# Patient Record
Sex: FEMALE | Race: WHITE | NOT HISPANIC OR LATINO | URBAN - METROPOLITAN AREA
[De-identification: names, ages, dates, MRNs, and addresses within clinical notes are randomized per-mention and may not be internally consistent; named-entity substitution may affect disease eponyms.]

---

## 2020-07-25 ENCOUNTER — TELEPHONE ENCOUNTER (OUTPATIENT)
Dept: URBAN - METROPOLITAN AREA CLINIC 13 | Facility: CLINIC | Age: 65
End: 2020-07-25

## 2020-07-25 RX ORDER — DOCUSATE SODIUM 100 MG/1
TAKE 1 CAPSULE DAILY CAPSULE, LIQUID FILLED ORAL
Refills: 0 | OUTPATIENT
End: 2015-09-28

## 2020-07-25 RX ORDER — POLYETHYLENE GLYCOL 3350, SODIUM CHLORIDE, SODIUM BICARBONATE AND POTASSIUM CHLORIDE WITH LEMON FLAVOR 420; 11.2; 5.72; 1.48 G/4L; G/4L; G/4L; G/4L
TAKE 1/2 GALLON AT 5:00 PM DAY BEFORE PROCEDURE, TAKE SECOND 1/2 OF GALLON 6 HRS PRIOR TO PROCEDURE POWDER, FOR SOLUTION ORAL
Qty: 1 | Refills: 0 | OUTPATIENT
Start: 2018-06-20 | End: 2018-06-28

## 2020-07-25 RX ORDER — ESOMEPRAZOLE MAGNESIUM 40 MG/1
TAKE ONE CAPSULE BY MOUTH TWICE DAILY CAPSULE, DELAYED RELEASE PELLETS ORAL
Qty: 60 | Refills: 1 | OUTPATIENT
Start: 2015-11-13 | End: 2017-03-10

## 2020-07-25 RX ORDER — GUAIFENESIN AND DEXTROMETHORPHAN HYDROBROMIDE 1200; 60 MG/1; MG/1
TAKE 1 TABLET EVERY 12 HOURS AS NEEDED TABLET, EXTENDED RELEASE ORAL
Refills: 0 | OUTPATIENT
End: 2014-04-25

## 2020-07-25 RX ORDER — FAMOTIDINE 40 MG/1
TAKE ONE TABLET BY MOUTH TWICE A DAY TABLET ORAL
Qty: 60 | Refills: 0 | OUTPATIENT
Start: 2014-04-23 | End: 2016-03-07

## 2020-07-25 RX ORDER — ESOMEPRAZOLE MAGNESIUM 40 MG/1
TAKE 1 CAPSULE DAILY CAPSULE, DELAYED RELEASE PELLETS ORAL
Qty: 30 | Refills: 3 | OUTPATIENT
Start: 2015-09-28 | End: 2017-03-10

## 2020-07-25 RX ORDER — FAMOTIDINE 40 MG/1
TAKE 1 TABLET DAILY AS DIRECTED TABLET ORAL
Refills: 0 | OUTPATIENT
Start: 2013-08-27 | End: 2014-04-25

## 2020-07-25 RX ORDER — ESTROGENS, CONJUGATED 0.45 MG/1
TAKE 1 TABLET DAILY TABLET, FILM COATED ORAL
Refills: 0 | OUTPATIENT
Start: 2008-06-12 | End: 2014-04-23

## 2020-07-26 ENCOUNTER — TELEPHONE ENCOUNTER (OUTPATIENT)
Dept: URBAN - METROPOLITAN AREA CLINIC 13 | Facility: CLINIC | Age: 65
End: 2020-07-26

## 2020-07-26 RX ORDER — BENAZEPRIL HYDROCHLORIDE AND HYDROCHLOROTHIAZIDE 20; 12.5 MG/1; MG/1
TABLET, FILM COATED ORAL
Qty: 30 | Refills: 0 | Status: ACTIVE | COMMUNITY
Start: 2013-04-01

## 2020-07-26 RX ORDER — FAMOTIDINE 40 MG/1
TAKE 1 TABLET BY MOUTH TWICE A DAY TABLET ORAL
Qty: 180 | Refills: 3 | Status: ACTIVE | COMMUNITY
Start: 2016-03-07

## 2020-07-26 RX ORDER — ESTRADIOL 0.5 MG/1
TABLET ORAL
Qty: 30 | Refills: 0 | Status: ACTIVE | COMMUNITY
Start: 2013-09-25

## 2020-07-26 RX ORDER — BENAZEPRIL HYDROCHLORIDE AND HYDROCHLOROTHIAZIDE 20; 12.5 MG/1; MG/1
TAKE 1 TABLET DAILY TABLET, FILM COATED ORAL
Refills: 0 | Status: ACTIVE | COMMUNITY
Start: 2013-07-01

## 2020-07-26 RX ORDER — AMOXICILLIN 875 MG/1
TABLET, FILM COATED ORAL
Qty: 20 | Refills: 0 | Status: ACTIVE | COMMUNITY
Start: 2013-03-19

## 2020-07-26 RX ORDER — FAMOTIDINE 40 MG/1
TABLET ORAL
Qty: 30 | Refills: 0 | Status: ACTIVE | COMMUNITY
Start: 2013-09-25

## 2020-07-26 RX ORDER — AMOXICILLIN AND CLAVULANATE POTASSIUM 875; 125 MG/1; MG/1
TABLET, FILM COATED ORAL
Qty: 20 | Refills: 0 | Status: ACTIVE | COMMUNITY
Start: 2013-10-11

## 2020-07-26 RX ORDER — PREDNISONE 20 MG/1
TABLET ORAL
Qty: 6 | Refills: 0 | Status: ACTIVE | COMMUNITY
Start: 2013-10-11

## 2020-07-26 RX ORDER — OXYCODONE AND ACETAMINOPHEN 5; 325 MG/1; MG/1
TABLET ORAL
Qty: 40 | Refills: 0 | Status: ACTIVE | COMMUNITY
Start: 2013-03-29

## 2020-07-26 RX ORDER — HYDROCODONE BITARTRATE AND ACETAMINOPHEN 7.5; 325 MG/1; MG/1
TABLET ORAL
Qty: 14 | Refills: 0 | Status: ACTIVE | COMMUNITY
Start: 2014-07-18

## 2020-07-26 RX ORDER — AZELASTINE HYDROCHLORIDE AND FLUTICASONE PROPIONATE 137; 50 UG/1; UG/1
SPRAY, METERED NASAL
Qty: 23 | Refills: 0 | Status: ACTIVE | COMMUNITY
Start: 2013-09-20

## 2020-07-26 RX ORDER — MULTIVIT-MIN/FA/LYCOPEN/LUTEIN .4-300-25
TAKE 1 TABLET DAILY TABLET ORAL
Refills: 0 | Status: ACTIVE | COMMUNITY

## 2020-07-26 RX ORDER — ATORVASTATIN CALCIUM 40 MG/1
TAKE 1 TABLET DAILY AT BEDTIME TABLET, FILM COATED ORAL
Qty: 30 | Refills: 0 | Status: ACTIVE | COMMUNITY
Start: 2013-06-14

## 2022-08-04 ENCOUNTER — OFFICE VISIT (OUTPATIENT)
Dept: URBAN - METROPOLITAN AREA CLINIC 113 | Facility: CLINIC | Age: 67
End: 2022-08-04

## 2022-11-15 ENCOUNTER — OFFICE VISIT (OUTPATIENT)
Dept: URBAN - METROPOLITAN AREA CLINIC 113 | Facility: CLINIC | Age: 67
End: 2022-11-15
Payer: MEDICARE

## 2022-11-15 ENCOUNTER — LAB OUTSIDE AN ENCOUNTER (OUTPATIENT)
Dept: URBAN - METROPOLITAN AREA CLINIC 113 | Facility: CLINIC | Age: 67
End: 2022-11-15

## 2022-11-15 VITALS — RESPIRATION RATE: 20 BRPM | WEIGHT: 165.4 LBS | TEMPERATURE: 97.5 F | HEIGHT: 64 IN | BODY MASS INDEX: 28.24 KG/M2

## 2022-11-15 DIAGNOSIS — K21.9 GASTROESOPHAGEAL REFLUX DISEASE, UNSPECIFIED WHETHER ESOPHAGITIS PRESENT: ICD-10-CM

## 2022-11-15 DIAGNOSIS — E83.00 LOW CERULOPLASMIN LEVEL: ICD-10-CM

## 2022-11-15 DIAGNOSIS — K59.09 INTERMITTENT CONSTIPATION: ICD-10-CM

## 2022-11-15 DIAGNOSIS — K52.9 COLITIS: ICD-10-CM

## 2022-11-15 PROCEDURE — 99244 OFF/OP CNSLTJ NEW/EST MOD 40: CPT

## 2022-11-15 PROCEDURE — 99204 OFFICE O/P NEW MOD 45 MIN: CPT

## 2022-11-15 RX ORDER — ZINC GLUCONATE 50 MG
1 TABLET TABLET ORAL ONCE A DAY
Status: ACTIVE | COMMUNITY

## 2022-11-15 RX ORDER — AMOXICILLIN 875 MG/1
TABLET, FILM COATED ORAL
Qty: 20 | Refills: 0 | Status: ON HOLD | COMMUNITY
Start: 2013-03-19

## 2022-11-15 RX ORDER — MELATONIN 1 MG/ML
1 CAPSULE LIQUID (ML) ORAL ONCE A DAY
Status: ACTIVE | COMMUNITY

## 2022-11-15 RX ORDER — PREDNISONE 20 MG/1
TABLET ORAL
Qty: 6 | Refills: 0 | Status: ON HOLD | COMMUNITY
Start: 2013-10-11

## 2022-11-15 RX ORDER — ATORVASTATIN CALCIUM 40 MG/1
TAKE 1 TABLET DAILY AT BEDTIME TABLET, FILM COATED ORAL
Qty: 30 | Refills: 0 | Status: ACTIVE | COMMUNITY
Start: 2013-06-14

## 2022-11-15 RX ORDER — MULTIVIT-MIN/FA/LYCOPEN/LUTEIN .4-300-25
TAKE 1 TABLET DAILY TABLET ORAL
Refills: 0 | Status: ACTIVE | COMMUNITY

## 2022-11-15 RX ORDER — AMOXICILLIN AND CLAVULANATE POTASSIUM 875; 125 MG/1; MG/1
TABLET, FILM COATED ORAL
Qty: 20 | Refills: 0 | Status: ON HOLD | COMMUNITY
Start: 2013-10-11

## 2022-11-15 RX ORDER — HYDROCODONE BITARTRATE AND ACETAMINOPHEN 7.5; 325 MG/1; MG/1
TABLET ORAL
Qty: 14 | Refills: 0 | Status: ON HOLD | COMMUNITY
Start: 2014-07-18

## 2022-11-15 RX ORDER — FAMOTIDINE 40 MG/1
TABLET ORAL
Qty: 30 | Refills: 0 | Status: ON HOLD | COMMUNITY
Start: 2013-09-25

## 2022-11-15 RX ORDER — AZELASTINE HYDROCHLORIDE AND FLUTICASONE PROPIONATE 137; 50 UG/1; UG/1
SPRAY, METERED NASAL
Qty: 23 | Refills: 0 | Status: ON HOLD | COMMUNITY
Start: 2013-09-20

## 2022-11-15 RX ORDER — PANTOPRAZOLE SODIUM 40 MG/1
1 TABLET TABLET, DELAYED RELEASE ORAL ONCE A DAY
Status: ACTIVE | COMMUNITY

## 2022-11-15 RX ORDER — ESTRADIOL 0.5 MG/1
TABLET ORAL
Qty: 30 | Refills: 0 | Status: ON HOLD | COMMUNITY
Start: 2013-09-25

## 2022-11-15 RX ORDER — OXYCODONE AND ACETAMINOPHEN 5; 325 MG/1; MG/1
TABLET ORAL
Qty: 40 | Refills: 0 | Status: ON HOLD | COMMUNITY
Start: 2013-03-29

## 2022-11-15 RX ORDER — ASPIRIN 81 MG/1
1 TABLET TABLET, COATED ORAL ONCE A DAY
Status: ACTIVE | COMMUNITY

## 2022-11-15 RX ORDER — BENAZEPRIL HYDROCHLORIDE AND HYDROCHLOROTHIAZIDE 20; 12.5 MG/1; MG/1
TABLET, FILM COATED ORAL
Qty: 30 | Refills: 0 | Status: ACTIVE | COMMUNITY
Start: 2013-04-01

## 2022-11-15 NOTE — PHYSICAL EXAM EYES:
Conjunctivae and eyelids appear normal, Sclerae : White without injection, no icterus, no evidence of dark rings on the outer borders of irises bilaterally

## 2022-11-15 NOTE — HPI-TODAY'S VISIT:
67-year-old female with a history of vitamin D deficiency, hyperlipidemia, hypertension, GERD is referred by Dr. Jamal Grimes for evaluation of elevated liver enzymes.  A copy of today's visit will be forwarded to the referring provider.  Patient has been followed by Dr. Gordon since 2008.  Screening colonoscopy performed in May 2008 revealed focal active colitis and ileitis suggestive of IBD.  Possible subclinical Crohn's disease was suspected though she was asymptomatic and no treatment was required at that time.  EGD performed in May 2014 for severe belching and GERD symptoms was notable for a small hiatal hernia and nonspecific chronic inactive gastritis.  Gastric emptying study at that same time demonstrated markedly delayed gastric emptying suggesting gastroparesis or possible gastric outlet obstruction.  She was to continue Pepcid twice daily and metoclopramide twice daily was added in.  This provided significant improvement she was to continue this regimen.  She did have elevated liver enzymes in 2014.  Ultrasound was unremarkable and additional labs were performed however it appears this only included negative iron studies and hepatitis serologies. Repeat colonoscopy in June 2018 was notable for diffuse chronic active colitis of the cecum of unknown etiology.  Again findings were suspicious for asymptomatic ulcerative colitis versus Crohn's though may also be NSAID induced colitis.  She was encouraged to minimize her NSAID use.  Next colonoscopy is due in June 2028.  She was recommended to add in FDguard twice daily for her upper GI symptoms with consideration of restarting metoclopramide in the future to relieve belching.  She has been lost to follow-up ever since 2018.  Of note, patient is on a statin medication.  Labs 7/12/2022: Negative BRIANA, positive ASMA of 55, negative hepatitis A antibody total, negative hepatitis B surface antigen, negative hepatitis B surface antibody, negative hepatitis B core antibody, negative hepatitis C antibody, low ceruloplasmin 17.6, positive AMA 44.9, elevated , elevated , normal ALP, normal total bilirubin, normal iron, normal ferritin, normal vitamin D, unremarkable lipid panel.  Labs 6/7/2022:, , hemoglobin 11.7, normal hematocrit, normal platelets.  RUQ US 8/17/22: Mildly echogenic liver suggesting hepatic steatosis. Normal shear wave velocity.   Labs 11/7/22: unremarkable CBC, normal lipid panel, AST 54, ALT 66, normal ALP, normal total bilirubin.   She denies a family history of liver disease. She rarely consumes ETOH and denies excessive Tylenol use. She goes to [facility] exams. She was recently seen in February and states everything was normal. She does have "sinus problems" described as water building up behind her eyes. She did have back surgery in January 2020. She fell shortly after that surgery and now has chronic hip pain. She does have shoulder pains which she attributes to her sleeping position secondary to her hip pain. She otherwise denies arthralgias. She does admit to worsening fatigue as of late. She admits she had the Covid booster in April prior to having her blood drawn in May.   Bowel movements are typically regular. SHe may have intermittent constipation. She does strain on occasion. She notices her constipation worsens when she eats beef. She denies blood per rectum or melena. She does have abdominal discomfort during bouts of constipation. This may be located in the RLQ or RUQ. The episodes are intermittent and not bothersome. She denies nausea or vomiting. She does have chronic GERD. She has to sleep with two pillows at night. She states the indigestion and epigastric burning will awaken her at night. She was taking up to 80 mg of OTC Pepcid a day. She was started on Pantoprazole 40 mg yesterday. She has already noticed improvement in sleep. She would like to proceed with a repeat EGD as she is concerned for gastric cancer or a stomach ulcer.

## 2022-11-18 ENCOUNTER — OFFICE VISIT (OUTPATIENT)
Dept: URBAN - METROPOLITAN AREA SURGERY CENTER 25 | Facility: SURGERY CENTER | Age: 67
End: 2022-11-18
Payer: MEDICARE

## 2022-11-18 DIAGNOSIS — K31.7 GASTRIC POLYPS: ICD-10-CM

## 2022-11-18 DIAGNOSIS — K31.811 ACQUIRED ARTERIOVENOUS MALFORMATION OF DUODENUM WITH HEMORRHAGE: ICD-10-CM

## 2022-11-18 DIAGNOSIS — K21.9 GASTROESOPHAGEAL REFLUX DISEASE: ICD-10-CM

## 2022-11-18 PROCEDURE — 43255 EGD CONTROL BLEEDING ANY: CPT | Performed by: INTERNAL MEDICINE

## 2022-11-18 PROCEDURE — G8907 PT DOC NO EVENTS ON DISCHARG: HCPCS | Performed by: INTERNAL MEDICINE

## 2022-11-18 RX ORDER — MELATONIN 1 MG/ML
1 CAPSULE LIQUID (ML) ORAL ONCE A DAY
Status: ACTIVE | COMMUNITY

## 2022-11-18 RX ORDER — OXYCODONE AND ACETAMINOPHEN 5; 325 MG/1; MG/1
TABLET ORAL
Qty: 40 | Refills: 0 | Status: ON HOLD | COMMUNITY
Start: 2013-03-29

## 2022-11-18 RX ORDER — PANTOPRAZOLE SODIUM 40 MG/1
1 TABLET TABLET, DELAYED RELEASE ORAL ONCE A DAY
Status: ACTIVE | COMMUNITY

## 2022-11-18 RX ORDER — BENAZEPRIL HYDROCHLORIDE AND HYDROCHLOROTHIAZIDE 20; 12.5 MG/1; MG/1
TABLET, FILM COATED ORAL
Qty: 30 | Refills: 0 | Status: ACTIVE | COMMUNITY
Start: 2013-04-01

## 2022-11-18 RX ORDER — ATORVASTATIN CALCIUM 40 MG/1
TAKE 1 TABLET DAILY AT BEDTIME TABLET, FILM COATED ORAL
Qty: 30 | Refills: 0 | Status: ACTIVE | COMMUNITY
Start: 2013-06-14

## 2022-11-18 RX ORDER — AMOXICILLIN AND CLAVULANATE POTASSIUM 875; 125 MG/1; MG/1
TABLET, FILM COATED ORAL
Qty: 20 | Refills: 0 | Status: ON HOLD | COMMUNITY
Start: 2013-10-11

## 2022-11-18 RX ORDER — HYDROCODONE BITARTRATE AND ACETAMINOPHEN 7.5; 325 MG/1; MG/1
TABLET ORAL
Qty: 14 | Refills: 0 | Status: ON HOLD | COMMUNITY
Start: 2014-07-18

## 2022-11-18 RX ORDER — FAMOTIDINE 40 MG/1
TABLET ORAL
Qty: 30 | Refills: 0 | Status: ON HOLD | COMMUNITY
Start: 2013-09-25

## 2022-11-18 RX ORDER — AMOXICILLIN 875 MG/1
TABLET, FILM COATED ORAL
Qty: 20 | Refills: 0 | Status: ON HOLD | COMMUNITY
Start: 2013-03-19

## 2022-11-18 RX ORDER — AZELASTINE HYDROCHLORIDE AND FLUTICASONE PROPIONATE 137; 50 UG/1; UG/1
SPRAY, METERED NASAL
Qty: 23 | Refills: 0 | Status: ON HOLD | COMMUNITY
Start: 2013-09-20

## 2022-11-18 RX ORDER — ASPIRIN 81 MG/1
1 TABLET TABLET, COATED ORAL ONCE A DAY
Status: ACTIVE | COMMUNITY

## 2022-11-18 RX ORDER — ZINC GLUCONATE 50 MG
1 TABLET TABLET ORAL ONCE A DAY
Status: ACTIVE | COMMUNITY

## 2022-11-18 RX ORDER — MULTIVIT-MIN/FA/LYCOPEN/LUTEIN .4-300-25
TAKE 1 TABLET DAILY TABLET ORAL
Refills: 0 | Status: ACTIVE | COMMUNITY

## 2022-11-18 RX ORDER — ESTRADIOL 0.5 MG/1
TABLET ORAL
Qty: 30 | Refills: 0 | Status: ON HOLD | COMMUNITY
Start: 2013-09-25

## 2022-11-18 RX ORDER — PREDNISONE 20 MG/1
TABLET ORAL
Qty: 6 | Refills: 0 | Status: ON HOLD | COMMUNITY
Start: 2013-10-11

## 2022-11-26 LAB
ACTIN (SMOOTH MUSCLE) ANTIBODY (IGG): 40
ALBUMIN/GLOBULIN RATIO: 1.6
ALBUMIN: 4.4
ALKALINE PHOSPHATASE: 50
ALPHA-1-ANTITRYPSIN (AAT) PHENOTYPE: (no result)
ALPHA-1-ANTITRYPSIN QN: 146
ALT (SGPT): 60
ANA SCREEN, IFA: POSITIVE
AST (SGOT): 49
BILIRUBIN, DIRECT: 0.1
BILIRUBIN, INDIRECT: 0.2
BILIRUBIN, TOTAL: 0.3
CERULOPLASMIN: 22
GLOBULIN: 2.8
IMMUNOGLOBULIN A: 104
IMMUNOGLOBULIN G: 1419
IMMUNOGLOBULIN M: 145
LKM-1 ANTIBODY (IGG): <=20
MITOCHONDRIAL (M2) ANTIBODY: 35.2
PROTEIN, TOTAL: 7.2
T-TRANSGLUTAMINASE (TTG) IGA: <1

## 2022-11-28 ENCOUNTER — TELEPHONE ENCOUNTER (OUTPATIENT)
Dept: URBAN - METROPOLITAN AREA CLINIC 113 | Facility: CLINIC | Age: 67
End: 2022-11-28

## 2022-11-29 PROBLEM — 235595009 GASTROESOPHAGEAL REFLUX DISEASE: Status: ACTIVE | Noted: 2022-11-29

## 2022-11-29 PROBLEM — 92411005 BENIGN NEOPLASM OF STOMACH: Status: ACTIVE | Noted: 2022-11-29

## 2022-11-30 ENCOUNTER — OFFICE VISIT (OUTPATIENT)
Dept: URBAN - METROPOLITAN AREA CLINIC 113 | Facility: CLINIC | Age: 67
End: 2022-11-30
Payer: MEDICARE

## 2022-11-30 VITALS
TEMPERATURE: 98 F | RESPIRATION RATE: 16 BRPM | HEIGHT: 64 IN | BODY MASS INDEX: 27.96 KG/M2 | SYSTOLIC BLOOD PRESSURE: 145 MMHG | HEART RATE: 93 BPM | WEIGHT: 163.8 LBS | DIASTOLIC BLOOD PRESSURE: 73 MMHG

## 2022-11-30 DIAGNOSIS — E83.00 LOW CERULOPLASMIN LEVEL: ICD-10-CM

## 2022-11-30 DIAGNOSIS — R74.8 ELEVATED LIVER ENZYMES: ICD-10-CM

## 2022-11-30 DIAGNOSIS — K21.9 GASTROESOPHAGEAL REFLUX DISEASE, UNSPECIFIED WHETHER ESOPHAGITIS PRESENT: ICD-10-CM

## 2022-11-30 DIAGNOSIS — K31.811 AVM (ARTERIOVENOUS MALFORMATION) OF DUODENUM, ACQUIRED WITH HEMORRHAGE: ICD-10-CM

## 2022-11-30 DIAGNOSIS — K59.09 INTERMITTENT CONSTIPATION: ICD-10-CM

## 2022-11-30 DIAGNOSIS — R76.8 ANTIMITOCHONDRIAL ANTIBODY POSITIVE: ICD-10-CM

## 2022-11-30 PROBLEM — 235595009: Status: ACTIVE | Noted: 2022-11-15

## 2022-11-30 PROBLEM — 365767001: Status: ACTIVE | Noted: 2022-11-15

## 2022-11-30 PROCEDURE — 99214 OFFICE O/P EST MOD 30 MIN: CPT | Performed by: INTERNAL MEDICINE

## 2022-11-30 RX ORDER — ESTRADIOL 0.5 MG/1
TABLET ORAL
Qty: 30 | Refills: 0 | Status: ON HOLD | COMMUNITY
Start: 2013-09-25

## 2022-11-30 RX ORDER — FENOFIBRATE 134 MG/1
1 CAPSULE WITH A MEAL CAPSULE ORAL ONCE A DAY
Status: ACTIVE | COMMUNITY

## 2022-11-30 RX ORDER — AMOXICILLIN 875 MG/1
TABLET, FILM COATED ORAL
Qty: 20 | Refills: 0 | Status: ON HOLD | COMMUNITY
Start: 2013-03-19

## 2022-11-30 RX ORDER — FAMOTIDINE 40 MG/1
TABLET ORAL
Qty: 30 | Refills: 0 | Status: ON HOLD | COMMUNITY
Start: 2013-09-25

## 2022-11-30 RX ORDER — BENAZEPRIL HYDROCHLORIDE AND HYDROCHLOROTHIAZIDE 20; 12.5 MG/1; MG/1
TABLET, FILM COATED ORAL
Qty: 30 | Refills: 0 | Status: ACTIVE | COMMUNITY
Start: 2013-04-01

## 2022-11-30 RX ORDER — MULTIVIT-MIN/FA/LYCOPEN/LUTEIN .4-300-25
TAKE 1 TABLET DAILY TABLET ORAL
Refills: 0 | Status: ON HOLD | COMMUNITY

## 2022-11-30 RX ORDER — HYDROCODONE BITARTRATE AND ACETAMINOPHEN 7.5; 325 MG/1; MG/1
TABLET ORAL
Qty: 14 | Refills: 0 | Status: ON HOLD | COMMUNITY
Start: 2014-07-18

## 2022-11-30 RX ORDER — MELATONIN 1 MG/ML
1 CAPSULE LIQUID (ML) ORAL ONCE A DAY
Status: ACTIVE | COMMUNITY

## 2022-11-30 RX ORDER — AMOXICILLIN AND CLAVULANATE POTASSIUM 875; 125 MG/1; MG/1
TABLET, FILM COATED ORAL
Qty: 20 | Refills: 0 | Status: ON HOLD | COMMUNITY
Start: 2013-10-11

## 2022-11-30 RX ORDER — AZELASTINE HYDROCHLORIDE AND FLUTICASONE PROPIONATE 137; 50 UG/1; UG/1
SPRAY, METERED NASAL
Qty: 23 | Refills: 0 | Status: ON HOLD | COMMUNITY
Start: 2013-09-20

## 2022-11-30 RX ORDER — PREDNISONE 20 MG/1
TABLET ORAL
Qty: 6 | Refills: 0 | Status: ON HOLD | COMMUNITY
Start: 2013-10-11

## 2022-11-30 RX ORDER — ATORVASTATIN CALCIUM 40 MG/1
TAKE 1 TABLET DAILY AT BEDTIME TABLET, FILM COATED ORAL
Qty: 30 | Refills: 0 | Status: ACTIVE | COMMUNITY
Start: 2013-06-14

## 2022-11-30 RX ORDER — GUAIFENESIN 600 MG/1
1 TABLET AS NEEDED TABLET, EXTENDED RELEASE ORAL
Status: ACTIVE | COMMUNITY

## 2022-11-30 RX ORDER — ZINC GLUCONATE 50 MG
1 TABLET TABLET ORAL ONCE A DAY
Status: ON HOLD | COMMUNITY

## 2022-11-30 RX ORDER — OXYCODONE AND ACETAMINOPHEN 5; 325 MG/1; MG/1
TABLET ORAL
Qty: 40 | Refills: 0 | Status: ON HOLD | COMMUNITY
Start: 2013-03-29

## 2022-11-30 RX ORDER — ASPIRIN 81 MG/1
1 TABLET TABLET, COATED ORAL ONCE A DAY
Status: ACTIVE | COMMUNITY

## 2022-11-30 RX ORDER — PANTOPRAZOLE SODIUM 40 MG/1
1 TABLET TABLET, DELAYED RELEASE ORAL ONCE A DAY
Status: ACTIVE | COMMUNITY

## 2022-11-30 NOTE — HPI-TODAY'S VISIT:
67-year-old female with a history of vitamin D deficiency, hyperlipidemia, hypertension, GERD is referred by Dr. Jamal Grimes for evaluation of elevated liver enzymes.  A copy of today's visit will be forwarded to the referring provider.  Patient has been followed by Dr. Gordon since 2008.  Screening colonoscopy performed in May 2008 revealed focal active colitis and ileitis suggestive of IBD.  Possible subclinical Crohn's disease was suspected though she was asymptomatic and no treatment was required at that time.  EGD performed in May 2014 for severe belching and GERD symptoms was notable for a small hiatal hernia and nonspecific chronic inactive gastritis.  Gastric emptying study at that same time demonstrated markedly delayed gastric emptying suggesting gastroparesis or possible gastric outlet obstruction.  She was to continue Pepcid twice daily and metoclopramide twice daily was added in.  This provided significant improvement she was to continue this regimen.  She did have elevated liver enzymes in 2014.  Ultrasound was unremarkable and additional labs were performed however it appears this only included negative iron studies and hepatitis serologies. Repeat colonoscopy in June 2018 was notable for diffuse chronic active colitis of the cecum of unknown etiology.  Again findings were suspicious for asymptomatic ulcerative colitis versus Crohn's though may also be NSAID induced colitis.  She was encouraged to minimize her NSAID use.  Next colonoscopy is due in June 2028.  She was recommended to add in FDguard twice daily for her upper GI symptoms with consideration of restarting metoclopramide in the future to relieve belching.  She has been lost to follow-up ever since 2018.  Of note, patient is on a statin medication.  Labs 7/12/2022: Negative BRIANA, positive ASMA of 55, negative hepatitis A antibody total, negative hepatitis B surface antigen, negative hepatitis B surface antibody, negative hepatitis B core antibody, negative hepatitis C antibody, low ceruloplasmin 17.6, positive AMA 44.9, elevated , elevated , normal ALP, normal total bilirubin, normal iron, normal ferritin, normal vitamin D, unremarkable lipid panel.  Labs 6/7/2022:, , hemoglobin 11.7, normal hematocrit, normal platelets.  RUQ US 8/17/22: Mildly echogenic liver suggesting hepatic steatosis. Normal shear wave velocity.   Labs 11/7/22: unremarkable CBC, normal lipid panel, AST 54, ALT 66, normal ALP, normal total bilirubin.   She denies a family history of liver disease. She rarely consumes ETOH and denies excessive Tylenol use. She goes to [facility] for [facility] eye exams. She was recently seen in February and states everything was normal. She does have "sinus problems" described as water building up behind her eyes. She did have back surgery in January 2020. She fell shortly after that surgery and now has chronic hip pain. She does have shoulder pains which she attributes to her sleeping position secondary to her hip pain. She otherwise denies arthralgias. She does admit to worsening fatigue as of late. She admits she had the Covid booster in April prior to having her blood drawn in May.   Bowel movements are typically regular. SHe may have intermittent constipation. She does strain on occasion. She notices her constipation worsens when she eats beef. She denies blood per rectum or melena. She does have abdominal discomfort during bouts of constipation. This may be located in the RLQ or RUQ. The episodes are intermittent and not bothersome. She denies nausea or vomiting. She does have chronic GERD. She has to sleep with two pillows at night. She states the indigestion and epigastric burning will awaken her at night. She was taking up to 80 mg of OTC Pepcid a day. She was started on Pantoprazole 40 mg yesterday. She has already noticed improvement in sleep. She would like to proceed with a repeat EGD as she is concerned for gastric cancer or a stomach ulcer. Interval history, 11/30/2022.  Upper endoscopy performed November 18 revealed a small hiatal hernia along with proton pump inhibitor polyps.  An actively bleeding AVM was seen in the third portion of the duodenum and was cauterized. The patient states overall she feels well at this time.  She discontinued her supplements.  I reviewed her supplements with her.  I provided extensive teaching in regards to autoimmune liver disease and arteriovenous malformations.

## 2023-01-03 ENCOUNTER — OFFICE VISIT (OUTPATIENT)
Dept: URBAN - METROPOLITAN AREA CLINIC 113 | Facility: CLINIC | Age: 68
End: 2023-01-03

## 2023-04-06 ENCOUNTER — OFFICE VISIT (OUTPATIENT)
Dept: URBAN - METROPOLITAN AREA CLINIC 113 | Facility: CLINIC | Age: 68
End: 2023-04-06
Payer: MEDICARE

## 2023-04-06 VITALS
TEMPERATURE: 97.5 F | RESPIRATION RATE: 16 BRPM | WEIGHT: 164 LBS | HEART RATE: 94 BPM | DIASTOLIC BLOOD PRESSURE: 67 MMHG | BODY MASS INDEX: 28 KG/M2 | HEIGHT: 64 IN | SYSTOLIC BLOOD PRESSURE: 147 MMHG

## 2023-04-06 DIAGNOSIS — K21.9 GASTROESOPHAGEAL REFLUX DISEASE, UNSPECIFIED WHETHER ESOPHAGITIS PRESENT: ICD-10-CM

## 2023-04-06 DIAGNOSIS — R74.8 ELEVATED LIVER ENZYMES: ICD-10-CM

## 2023-04-06 DIAGNOSIS — K59.09 INTERMITTENT CONSTIPATION: ICD-10-CM

## 2023-04-06 DIAGNOSIS — R76.8 ANTIMITOCHONDRIAL ANTIBODY POSITIVE: ICD-10-CM

## 2023-04-06 DIAGNOSIS — K31.811 AVM (ARTERIOVENOUS MALFORMATION) OF DUODENUM, ACQUIRED WITH HEMORRHAGE: ICD-10-CM

## 2023-04-06 DIAGNOSIS — E83.00 LOW CERULOPLASMIN LEVEL: ICD-10-CM

## 2023-04-06 PROCEDURE — 99214 OFFICE O/P EST MOD 30 MIN: CPT | Performed by: INTERNAL MEDICINE

## 2023-04-06 RX ORDER — GUAIFENESIN 600 MG/1
1 TABLET AS NEEDED TABLET, EXTENDED RELEASE ORAL
Status: ACTIVE | COMMUNITY

## 2023-04-06 RX ORDER — AZELASTINE HYDROCHLORIDE AND FLUTICASONE PROPIONATE 137; 50 UG/1; UG/1
SPRAY, METERED NASAL
Qty: 23 | Refills: 0 | Status: ON HOLD | COMMUNITY
Start: 2013-09-20

## 2023-04-06 RX ORDER — FENOFIBRATE 134 MG/1
1 CAPSULE WITH A MEAL CAPSULE ORAL ONCE A DAY
Status: ACTIVE | COMMUNITY

## 2023-04-06 RX ORDER — PREDNISONE 20 MG/1
TABLET ORAL
Qty: 6 | Refills: 0 | Status: ON HOLD | COMMUNITY
Start: 2013-10-11

## 2023-04-06 RX ORDER — MULTIVIT-MIN/FA/LYCOPEN/LUTEIN .4-300-25
TAKE 1 TABLET DAILY TABLET ORAL
Refills: 0 | Status: ON HOLD | COMMUNITY

## 2023-04-06 RX ORDER — OXYCODONE AND ACETAMINOPHEN 5; 325 MG/1; MG/1
TABLET ORAL
Qty: 40 | Refills: 0 | Status: ON HOLD | COMMUNITY
Start: 2013-03-29

## 2023-04-06 RX ORDER — AMOXICILLIN 875 MG/1
TABLET, FILM COATED ORAL
Qty: 20 | Refills: 0 | Status: ON HOLD | COMMUNITY
Start: 2013-03-19

## 2023-04-06 RX ORDER — FAMOTIDINE 40 MG/1
TABLET ORAL
Qty: 30 | Refills: 0 | Status: ON HOLD | COMMUNITY
Start: 2013-09-25

## 2023-04-06 RX ORDER — ATORVASTATIN CALCIUM 40 MG/1
TAKE 1 TABLET DAILY AT BEDTIME TABLET, FILM COATED ORAL
Qty: 30 | Refills: 0 | Status: ACTIVE | COMMUNITY
Start: 2013-06-14

## 2023-04-06 RX ORDER — ESTRADIOL 0.5 MG/1
TABLET ORAL
Qty: 30 | Refills: 0 | Status: ON HOLD | COMMUNITY
Start: 2013-09-25

## 2023-04-06 RX ORDER — PANTOPRAZOLE SODIUM 40 MG/1
1 TABLET TABLET, DELAYED RELEASE ORAL ONCE A DAY
Status: ACTIVE | COMMUNITY

## 2023-04-06 RX ORDER — MELATONIN 1 MG/ML
1 CAPSULE LIQUID (ML) ORAL ONCE A DAY
Status: ACTIVE | COMMUNITY

## 2023-04-06 RX ORDER — BENAZEPRIL HYDROCHLORIDE AND HYDROCHLOROTHIAZIDE 20; 12.5 MG/1; MG/1
TABLET, FILM COATED ORAL
Qty: 30 | Refills: 0 | Status: ACTIVE | COMMUNITY
Start: 2013-04-01

## 2023-04-06 RX ORDER — AMOXICILLIN AND CLAVULANATE POTASSIUM 875; 125 MG/1; MG/1
TABLET, FILM COATED ORAL
Qty: 20 | Refills: 0 | Status: ON HOLD | COMMUNITY
Start: 2013-10-11

## 2023-04-06 RX ORDER — HYDROCODONE BITARTRATE AND ACETAMINOPHEN 7.5; 325 MG/1; MG/1
TABLET ORAL
Qty: 14 | Refills: 0 | Status: ON HOLD | COMMUNITY
Start: 2014-07-18

## 2023-04-06 RX ORDER — ZINC GLUCONATE 50 MG
1 TABLET TABLET ORAL ONCE A DAY
Status: ON HOLD | COMMUNITY

## 2023-04-06 RX ORDER — ASPIRIN 81 MG/1
1 TABLET TABLET, COATED ORAL ONCE A DAY
Status: ACTIVE | COMMUNITY

## 2023-04-06 NOTE — HPI-TODAY'S VISIT:
67-year-old female with a history of vitamin D deficiency, hyperlipidemia, hypertension, GERD is referred by Dr. Jamal Grimes for evaluation of elevated liver enzymes.  A copy of today's visit will be forwarded to the referring provider.  Patient has been followed by Dr. Gordon since 2008.  Screening colonoscopy performed in May 2008 revealed focal active colitis and ileitis suggestive of IBD.  Possible subclinical Crohn's disease was suspected though she was asymptomatic and no treatment was required at that time.  EGD performed in May 2014 for severe belching and GERD symptoms was notable for a small hiatal hernia and nonspecific chronic inactive gastritis.  Gastric emptying study at that same time demonstrated markedly delayed gastric emptying suggesting gastroparesis or possible gastric outlet obstruction.  She was to continue Pepcid twice daily and metoclopramide twice daily was added in.  This provided significant improvement she was to continue this regimen.  She did have elevated liver enzymes in 2014.  Ultrasound was unremarkable and additional labs were performed however it appears this only included negative iron studies and hepatitis serologies. Repeat colonoscopy in June 2018 was notable for diffuse chronic active colitis of the cecum of unknown etiology.  Again findings were suspicious for asymptomatic ulcerative colitis versus Crohn's though may also be NSAID induced colitis.  She was encouraged to minimize her NSAID use.  Next colonoscopy is due in June 2028.  She was recommended to add in FDguard twice daily for her upper GI symptoms with consideration of restarting metoclopramide in the future to relieve belching.  She has been lost to follow-up ever since 2018.  Of note, patient is on a statin medication.  Labs 7/12/2022: Negative BRIANA, positive ASMA of 55, negative hepatitis A antibody total, negative hepatitis B surface antigen, negative hepatitis B surface antibody, negative hepatitis B core antibody, negative hepatitis C antibody, low ceruloplasmin 17.6, positive AMA 44.9, elevated , elevated , normal ALP, normal total bilirubin, normal iron, normal ferritin, normal vitamin D, unremarkable lipid panel.  Labs 6/7/2022:, , hemoglobin 11.7, normal hematocrit, normal platelets.  RUQ US 8/17/22: Mildly echogenic liver suggesting hepatic steatosis. Normal shear wave velocity.   Labs 11/7/22: unremarkable CBC, normal lipid panel, AST 54, ALT 66, normal ALP, normal total bilirubin.   She denies a family history of liver disease. She rarely consumes ETOH and denies excessive Tylenol use. She goes to [facility] for routine eye exams. She was recently seen in February and states everything was normal. She does have "sinus problems" described as water building up behind her eyes. She did have back surgery in January 2020. She fell shortly after that surgery and now has chronic hip pain. She does have shoulder pains which she attributes to her sleeping position secondary to her hip pain. She otherwise denies arthralgias. She does admit to worsening fatigue as of late. She admits she had the Covid booster in April prior to having her blood drawn in May.   Bowel movements are typically regular. SHe may have intermittent constipation. She does strain on occasion. She notices her constipation worsens when she eats beef. She denies blood per rectum or melena. She does have abdominal discomfort during bouts of constipation. This may be located in the RLQ or RUQ. The episodes are intermittent and not bothersome. She denies nausea or vomiting. She does have chronic GERD. She has to sleep with two pillows at night. She states the indigestion and epigastric burning will awaken her at night. She was taking up to 80 mg of OTC Pepcid a day. She was started on Pantoprazole 40 mg yesterday. She has already noticed improvement in sleep. She would like to proceed with a repeat EGD as she is concerned for gastric cancer or a stomach ulcer. Interval history, 11/30/2022.  Upper endoscopy performed November 18 revealed a small hiatal hernia along with proton pump inhibitor polyps.  An actively bleeding AVM was seen in the third portion of the duodenum and was cauterized. The patient states overall she feels well at this time.  She discontinued her supplements.  I reviewed her supplements with her.  I provided extensive teaching in regards to autoimmune liver disease and arteriovenous malformations. interval history, 4/6/2023.  The patient states she has been in good health since I last saw her.  She had no laboratory testing since then.  She states her bowel movements are good with MiraLAX which she titrates to effect.  She states her acid reflux is under good control with pantoprazole 40 mg daily.

## 2023-04-07 ENCOUNTER — TELEPHONE ENCOUNTER (OUTPATIENT)
Dept: URBAN - METROPOLITAN AREA CLINIC 113 | Facility: CLINIC | Age: 68
End: 2023-04-07

## 2023-04-07 PROBLEM — 724556004: Status: ACTIVE | Noted: 2023-04-07

## 2023-04-07 LAB
A/G RATIO: 1.6
ABSOLUTE BASOPHILS: 38
ABSOLUTE EOSINOPHILS: 102
ABSOLUTE LYMPHOCYTES: 2182
ABSOLUTE MONOCYTES: 621
ABSOLUTE NEUTROPHILS: 3456
ALBUMIN: 4.4
ALKALINE PHOSPHATASE: 47
ALT (SGPT): 35
AST (SGOT): 36
BASOPHILS: 0.6
BILIRUBIN, TOTAL: 0.3
BUN/CREATININE RATIO: 15
BUN: 18
CALCIUM: 10.3
CARBON DIOXIDE, TOTAL: 26
CHLORIDE: 105
CREATININE: 1.21
EGFR: 48
EOSINOPHILS: 1.6
GLOBULIN, TOTAL: 2.8
GLUCOSE: 70
HEMATOCRIT: 32.6
HEMOGLOBIN: 10.1
LYMPHOCYTES: 34.1
MCH: 24.3
MCHC: 31
MCV: 78.4
MONOCYTES: 9.7
MPV: 11.4
NEUTROPHILS: 54
PLATELET COUNT: 363
POTASSIUM: 4.1
PROTEIN, TOTAL: 7.2
RDW: 14.4
RED BLOOD CELL COUNT: 4.16
SODIUM: 145
WHITE BLOOD CELL COUNT: 6.4

## 2023-09-12 ENCOUNTER — OFFICE VISIT (OUTPATIENT)
Dept: URBAN - METROPOLITAN AREA CLINIC 113 | Facility: CLINIC | Age: 68
End: 2023-09-12
Payer: MEDICARE

## 2023-09-12 VITALS
SYSTOLIC BLOOD PRESSURE: 164 MMHG | DIASTOLIC BLOOD PRESSURE: 77 MMHG | HEART RATE: 103 BPM | WEIGHT: 173.2 LBS | BODY MASS INDEX: 29.57 KG/M2 | RESPIRATION RATE: 18 BRPM | TEMPERATURE: 97.5 F | HEIGHT: 64 IN

## 2023-09-12 DIAGNOSIS — K59.09 INTERMITTENT CONSTIPATION: ICD-10-CM

## 2023-09-12 DIAGNOSIS — R74.8 ELEVATED LIVER ENZYMES: ICD-10-CM

## 2023-09-12 DIAGNOSIS — R76.8 ANTIMITOCHONDRIAL ANTIBODY POSITIVE: ICD-10-CM

## 2023-09-12 DIAGNOSIS — D50.0 IRON DEFICIENCY ANEMIA DUE TO CHRONIC BLOOD LOSS: ICD-10-CM

## 2023-09-12 DIAGNOSIS — E83.00 LOW CERULOPLASMIN LEVEL: ICD-10-CM

## 2023-09-12 DIAGNOSIS — K21.9 GASTROESOPHAGEAL REFLUX DISEASE, UNSPECIFIED WHETHER ESOPHAGITIS PRESENT: ICD-10-CM

## 2023-09-12 DIAGNOSIS — K31.811 AVM (ARTERIOVENOUS MALFORMATION) OF DUODENUM, ACQUIRED WITH HEMORRHAGE: ICD-10-CM

## 2023-09-12 PROCEDURE — 99214 OFFICE O/P EST MOD 30 MIN: CPT

## 2023-09-12 RX ORDER — ATORVASTATIN CALCIUM 40 MG/1
TAKE 1 TABLET DAILY AT BEDTIME TABLET, FILM COATED ORAL
Qty: 30 | Refills: 0 | Status: ACTIVE | COMMUNITY
Start: 2013-06-14

## 2023-09-12 RX ORDER — AMOXICILLIN 875 MG/1
TABLET, FILM COATED ORAL
Qty: 20 | Refills: 0 | Status: ON HOLD | COMMUNITY
Start: 2013-03-19

## 2023-09-12 RX ORDER — BENAZEPRIL HYDROCHLORIDE AND HYDROCHLOROTHIAZIDE 20; 12.5 MG/1; MG/1
TABLET, FILM COATED ORAL
Qty: 30 | Refills: 0 | Status: ACTIVE | COMMUNITY
Start: 2013-04-01

## 2023-09-12 RX ORDER — MELATONIN 1 MG/ML
1 CAPSULE LIQUID (ML) ORAL ONCE A DAY
Status: ACTIVE | COMMUNITY

## 2023-09-12 RX ORDER — PANTOPRAZOLE SODIUM 40 MG/1
1 TABLET TABLET, DELAYED RELEASE ORAL ONCE A DAY
Status: ACTIVE | COMMUNITY

## 2023-09-12 RX ORDER — PREDNISONE 20 MG/1
TABLET ORAL
Qty: 6 | Refills: 0 | Status: ON HOLD | COMMUNITY
Start: 2013-10-11

## 2023-09-12 RX ORDER — MULTIVIT-MIN/FA/LYCOPEN/LUTEIN .4-300-25
TAKE 1 TABLET DAILY TABLET ORAL
Refills: 0 | Status: ON HOLD | COMMUNITY

## 2023-09-12 RX ORDER — FAMOTIDINE 40 MG/1
TABLET ORAL
Qty: 30 | Refills: 0 | Status: ON HOLD | COMMUNITY
Start: 2013-09-25

## 2023-09-12 RX ORDER — AMOXICILLIN AND CLAVULANATE POTASSIUM 875; 125 MG/1; MG/1
TABLET, FILM COATED ORAL
Qty: 20 | Refills: 0 | Status: ON HOLD | COMMUNITY
Start: 2013-10-11

## 2023-09-12 RX ORDER — FENOFIBRATE 134 MG/1
1 CAPSULE WITH A MEAL CAPSULE ORAL ONCE A DAY
Status: ACTIVE | COMMUNITY

## 2023-09-12 RX ORDER — ZINC GLUCONATE 50 MG
1 TABLET TABLET ORAL ONCE A DAY
Status: ON HOLD | COMMUNITY

## 2023-09-12 RX ORDER — ASPIRIN 81 MG/1
1 TABLET TABLET, COATED ORAL ONCE A DAY
Status: ACTIVE | COMMUNITY

## 2023-09-12 RX ORDER — AZELASTINE HYDROCHLORIDE AND FLUTICASONE PROPIONATE 137; 50 UG/1; UG/1
SPRAY, METERED NASAL
Qty: 23 | Refills: 0 | Status: ON HOLD | COMMUNITY
Start: 2013-09-20

## 2023-09-12 RX ORDER — GUAIFENESIN 600 MG/1
1 TABLET AS NEEDED TABLET, EXTENDED RELEASE ORAL
Status: ACTIVE | COMMUNITY

## 2023-09-12 RX ORDER — HYDROCODONE BITARTRATE AND ACETAMINOPHEN 7.5; 325 MG/1; MG/1
TABLET ORAL
Qty: 14 | Refills: 0 | Status: ON HOLD | COMMUNITY
Start: 2014-07-18

## 2023-09-12 RX ORDER — OXYCODONE AND ACETAMINOPHEN 5; 325 MG/1; MG/1
TABLET ORAL
Qty: 40 | Refills: 0 | Status: ON HOLD | COMMUNITY
Start: 2013-03-29

## 2023-09-12 RX ORDER — FERROUS SULFATE 325(65) MG
1 TABLET TABLET ORAL
Status: ACTIVE | COMMUNITY

## 2023-09-12 RX ORDER — ESTRADIOL 0.5 MG/1
TABLET ORAL
Qty: 30 | Refills: 0 | Status: ON HOLD | COMMUNITY
Start: 2013-09-25

## 2023-09-12 NOTE — HPI-TODAY'S VISIT:
67-year-old female with a history of vitamin D deficiency, hyperlipidemia, hypertension, GERD is referred by Dr. Jamal Grimes for evaluation of elevated liver enzymes.  A copy of today's visit will be forwarded to the referring provider.  Patient has been followed by Dr. Gordon since 2008.  Screening colonoscopy performed in May 2008 revealed focal active colitis and ileitis suggestive of IBD.  Possible subclinical Crohn's disease was suspected though she was asymptomatic and no treatment was required at that time.  EGD performed in May 2014 for severe belching and GERD symptoms was notable for a small hiatal hernia and nonspecific chronic inactive gastritis.  Gastric emptying study at that same time demonstrated markedly delayed gastric emptying suggesting gastroparesis or possible gastric outlet obstruction.  She was to continue Pepcid twice daily and metoclopramide twice daily was added in.  This provided significant improvement she was to continue this regimen.  She did have elevated liver enzymes in 2014.  Ultrasound was unremarkable and additional labs were performed however it appears this only included negative iron studies and hepatitis serologies. Repeat colonoscopy in June 2018 was notable for diffuse chronic active colitis of the cecum of unknown etiology.  Again findings were suspicious for asymptomatic ulcerative colitis versus Crohn's though may also be NSAID induced colitis.  She was encouraged to minimize her NSAID use.  Next colonoscopy is due in June 2028.  She was recommended to add in FDguard twice daily for her upper GI symptoms with consideration of restarting metoclopramide in the future to relieve belching.  She has been lost to follow-up ever since 2018.  Of note, patient is on a statin medication.  Labs 7/12/2022: Negative BRIANA, positive ASMA of 55, negative hepatitis A antibody total, negative hepatitis B surface antigen, negative hepatitis B surface antibody, negative hepatitis B core antibody, negative hepatitis C antibody, low ceruloplasmin 17.6, positive AMA 44.9, elevated , elevated , normal ALP, normal total bilirubin, normal iron, normal ferritin, normal vitamin D, unremarkable lipid panel.  Labs 6/7/2022:, , hemoglobin 11.7, normal hematocrit, normal platelets.  RUQ US 8/17/22: Mildly echogenic liver suggesting hepatic steatosis. Normal shear wave velocity.   Labs 11/7/22: unremarkable CBC, normal lipid panel, AST 54, ALT 66, normal ALP, normal total bilirubin.   She denies a family history of liver disease. She rarely consumes ETOH and denies excessive Tylenol use. She goes to [facility] for routine eye exams. She was recently seen in February and states everything was normal. She does have "sinus problems" described as water building up behind her eyes. She did have back surgery in January 2020. She fell shortly after that surgery and now has chronic hip pain. She does have shoulder pains which she attributes to her sleeping position secondary to her hip pain. She otherwise denies arthralgias. She does admit to worsening fatigue as of late. She admits she had the Covid booster in April prior to having her blood drawn in May.   Bowel movements are typically regular. SHe may have intermittent constipation. She does strain on occasion. She notices her constipation worsens when she eats beef. She denies blood per rectum or melena. She does have abdominal discomfort during bouts of constipation. This may be located in the RLQ or RUQ. The episodes are intermittent and not bothersome. She denies nausea or vomiting. She does have chronic GERD. She has to sleep with two pillows at night. She states the indigestion and epigastric burning will awaken her at night. She was taking up to 80 mg of OTC Pepcid a day. She was started on Pantoprazole 40 mg yesterday. She has already noticed improvement in sleep. She would like to proceed with a repeat EGD as she is concerned for gastric cancer or a stomach ulcer. Interval history, 11/30/2022.  Upper endoscopy performed November 18 revealed a small hiatal hernia along with proton pump inhibitor polyps.  An actively bleeding AVM was seen in the third portion of the duodenum and was cauterized. The patient states overall she feels well at this time.  She discontinued her supplements.  I reviewed her supplements with her.  I provided extensive teaching in regards to autoimmune liver disease and arteriovenous malformations. interval history, 4/6/2023.  The patient states she has been in good health since I last saw her.  She had no laboratory testing since then.  She states her bowel movements are good with MiraLAX which she titrates to effect.  She states her acid reflux is under good control with pantoprazole 40 mg daily.  Interval history, 9/12/2023: 68-year-old female presents for follow-up.  She was last seen on 4/6/2023.  She had abnormal LFTs of unknown etiology with mild elevations in autoimmune markers.  We will continue to trend and if increased we will try prednisone or a lower liver biopsy.  Labs 4/6/2023:Creatinine 1.21, GFR 48, total bilirubin 0.3, alkaline phosphatase 47, AST 36, ALT 35, hemoglobin 10.1, hematocrit 32.6, MCV 78.4, normal platelet count 363.  She was informed liver enzymes are improved but still slightly abnormal.  She was informed of mild anemia and recommended iron studies.  Labs 5/16/2023:Unremarkable urinalysis, normal total bilirubin, ALT 48, AST 45, normal alkaline phosphatase, normal lipid panel, normal direct bilirubin, hemoglobin 10.4, hematocrit 32.6, MCV 76.3.  He never obtained iron studies so she was started on daily oral iron supplement by her PCP. She has been taking the iron since May. This has caused dark stools. Her constipation is well contorlled on Miralax daily. Denies abdominal pain, blood per rectum or melena. Denies nausea or vomiting. GERD is well controlled on Pantoprazole 40 mg once daily. She does take OTC Pepcid most nights as well.

## 2023-09-13 LAB
A/G RATIO: 1.6
ABSOLUTE BASOPHILS: 43
ABSOLUTE EOSINOPHILS: 204
ABSOLUTE LYMPHOCYTES: 2975
ABSOLUTE MONOCYTES: 672
ABSOLUTE NEUTROPHILS: 4607
ALBUMIN: 4
ALKALINE PHOSPHATASE: 54
ALT (SGPT): 27
AST (SGOT): 23
BASOPHILS: 0.5
BILIRUBIN, TOTAL: 0.3
BUN/CREATININE RATIO: 17
BUN: 19
CALCIUM: 10.1
CARBON DIOXIDE, TOTAL: 26
CHLORIDE: 104
CREATININE: 1.12
EGFR: 53
EOSINOPHILS: 2.4
FERRITIN, SERUM: 49
GLOBULIN, TOTAL: 2.5
GLUCOSE: 145
HEMATOCRIT: 40.8
HEMOGLOBIN: 12.6
IRON BIND.CAP.(TIBC): 416
IRON SATURATION: 25
IRON: 105
LYMPHOCYTES: 35
MCH: 26.5
MCHC: 30.9
MCV: 85.7
MONOCYTES: 7.9
MPV: 11.2
NEUTROPHILS: 54.2
PLATELET COUNT: 321
POTASSIUM: 4.2
PROTEIN, TOTAL: 6.5
RDW: 14.1
RED BLOOD CELL COUNT: 4.76
SODIUM: 140
WHITE BLOOD CELL COUNT: 8.5

## 2023-12-21 ENCOUNTER — LAB OUTSIDE AN ENCOUNTER (OUTPATIENT)
Dept: URBAN - METROPOLITAN AREA CLINIC 113 | Facility: CLINIC | Age: 68
End: 2023-12-21

## 2023-12-21 ENCOUNTER — CLAIMS CREATED FROM THE CLAIM WINDOW (OUTPATIENT)
Dept: URBAN - METROPOLITAN AREA CLINIC 113 | Facility: CLINIC | Age: 68
End: 2023-12-21
Payer: MEDICARE

## 2023-12-21 VITALS
RESPIRATION RATE: 18 BRPM | WEIGHT: 173 LBS | BODY MASS INDEX: 29.53 KG/M2 | TEMPERATURE: 97.3 F | HEART RATE: 85 BPM | SYSTOLIC BLOOD PRESSURE: 154 MMHG | DIASTOLIC BLOOD PRESSURE: 76 MMHG | HEIGHT: 64 IN

## 2023-12-21 DIAGNOSIS — R13.19 ESOPHAGEAL DYSPHAGIA: ICD-10-CM

## 2023-12-21 DIAGNOSIS — K31.811 AVM (ARTERIOVENOUS MALFORMATION) OF DUODENUM, ACQUIRED WITH HEMORRHAGE: ICD-10-CM

## 2023-12-21 DIAGNOSIS — R74.8 ELEVATED LIVER ENZYMES: ICD-10-CM

## 2023-12-21 DIAGNOSIS — D50.8 ACHLORHYDRIC ANEMIA: ICD-10-CM

## 2023-12-21 DIAGNOSIS — K52.9 COLITIS: ICD-10-CM

## 2023-12-21 DIAGNOSIS — K21.9 GASTROESOPHAGEAL REFLUX DISEASE, UNSPECIFIED WHETHER ESOPHAGITIS PRESENT: ICD-10-CM

## 2023-12-21 DIAGNOSIS — D50.0 IRON DEFICIENCY ANEMIA DUE TO CHRONIC BLOOD LOSS: ICD-10-CM

## 2023-12-21 PROCEDURE — 99214 OFFICE O/P EST MOD 30 MIN: CPT | Performed by: INTERNAL MEDICINE

## 2023-12-21 RX ORDER — HYDROCODONE BITARTRATE AND ACETAMINOPHEN 7.5; 325 MG/1; MG/1
TABLET ORAL
Qty: 14 | Refills: 0 | Status: ON HOLD | COMMUNITY
Start: 2014-07-18

## 2023-12-21 RX ORDER — FAMOTIDINE 40 MG/1
TABLET ORAL
Qty: 30 | Refills: 0 | Status: ON HOLD | COMMUNITY
Start: 2013-09-25

## 2023-12-21 RX ORDER — FENOFIBRATE 134 MG/1
1 CAPSULE WITH A MEAL CAPSULE ORAL ONCE A DAY
Status: ACTIVE | COMMUNITY

## 2023-12-21 RX ORDER — MELATONIN 1 MG/ML
1 CAPSULE LIQUID (ML) ORAL ONCE A DAY
Status: ACTIVE | COMMUNITY

## 2023-12-21 RX ORDER — OXYCODONE AND ACETAMINOPHEN 5; 325 MG/1; MG/1
TABLET ORAL
Qty: 40 | Refills: 0 | Status: ON HOLD | COMMUNITY
Start: 2013-03-29

## 2023-12-21 RX ORDER — AMOXICILLIN AND CLAVULANATE POTASSIUM 875; 125 MG/1; MG/1
TABLET, FILM COATED ORAL
Qty: 20 | Refills: 0 | Status: ON HOLD | COMMUNITY
Start: 2013-10-11

## 2023-12-21 RX ORDER — FERROUS SULFATE 325(65) MG
1 TABLET TABLET ORAL
Status: ON HOLD | COMMUNITY

## 2023-12-21 RX ORDER — AZELASTINE HYDROCHLORIDE AND FLUTICASONE PROPIONATE 137; 50 UG/1; UG/1
SPRAY, METERED NASAL
Qty: 23 | Refills: 0 | Status: ON HOLD | COMMUNITY
Start: 2013-09-20

## 2023-12-21 RX ORDER — PREDNISONE 20 MG/1
TABLET ORAL
Qty: 6 | Refills: 0 | Status: ON HOLD | COMMUNITY
Start: 2013-10-11

## 2023-12-21 RX ORDER — AMOXICILLIN 875 MG/1
TABLET, FILM COATED ORAL
Qty: 20 | Refills: 0 | Status: ON HOLD | COMMUNITY
Start: 2013-03-19

## 2023-12-21 RX ORDER — ATORVASTATIN CALCIUM 40 MG/1
TAKE 1 TABLET DAILY AT BEDTIME TABLET, FILM COATED ORAL
Qty: 30 | Refills: 0 | Status: ACTIVE | COMMUNITY
Start: 2013-06-14

## 2023-12-21 RX ORDER — GUAIFENESIN 600 MG/1
1 TABLET AS NEEDED TABLET, EXTENDED RELEASE ORAL
Status: ACTIVE | COMMUNITY

## 2023-12-21 RX ORDER — ESTRADIOL 0.5 MG/1
TABLET ORAL
Qty: 30 | Refills: 0 | Status: ON HOLD | COMMUNITY
Start: 2013-09-25

## 2023-12-21 RX ORDER — ZINC GLUCONATE 50 MG
1 TABLET TABLET ORAL ONCE A DAY
Status: ON HOLD | COMMUNITY

## 2023-12-21 RX ORDER — ASPIRIN 81 MG/1
1 TABLET TABLET, COATED ORAL ONCE A DAY
Status: ACTIVE | COMMUNITY

## 2023-12-21 RX ORDER — MULTIVIT-MIN/FA/LYCOPEN/LUTEIN .4-300-25
TAKE 1 TABLET DAILY TABLET ORAL
Refills: 0 | Status: ON HOLD | COMMUNITY

## 2023-12-21 RX ORDER — PANTOPRAZOLE SODIUM 40 MG/1
1 TABLET TABLET, DELAYED RELEASE ORAL ONCE A DAY
Status: ACTIVE | COMMUNITY

## 2023-12-21 RX ORDER — BENAZEPRIL HYDROCHLORIDE AND HYDROCHLOROTHIAZIDE 20; 12.5 MG/1; MG/1
TABLET, FILM COATED ORAL
Qty: 30 | Refills: 0 | Status: ACTIVE | COMMUNITY
Start: 2013-04-01

## 2023-12-21 NOTE — HPI-TODAY'S VISIT:
67-year-old female with a history of vitamin D deficiency, hyperlipidemia, hypertension, GERD is referred by Dr. Jamal Grimes for evaluation of elevated liver enzymes.  A copy of today's visit will be forwarded to the referring provider.  Patient has been followed by Dr. Gordon since 2008.  Screening colonoscopy performed in May 2008 revealed focal active colitis and ileitis suggestive of IBD.  Possible subclinical Crohn's disease was suspected though she was asymptomatic and no treatment was required at that time.  EGD performed in May 2014 for severe belching and GERD symptoms was notable for a small hiatal hernia and nonspecific chronic inactive gastritis.  Gastric emptying study at that same time demonstrated markedly delayed gastric emptying suggesting gastroparesis or possible gastric outlet obstruction.  She was to continue Pepcid twice daily and metoclopramide twice daily was added in.  This provided significant improvement she was to continue this regimen.  She did have elevated liver enzymes in 2014.  Ultrasound was unremarkable and additional labs were performed however it appears this only included negative iron studies and hepatitis serologies. Repeat colonoscopy in June 2018 was notable for diffuse chronic active colitis of the cecum of unknown etiology.  Again findings were suspicious for asymptomatic ulcerative colitis versus Crohn's though may also be NSAID induced colitis.  She was encouraged to minimize her NSAID use.  Next colonoscopy is due in June 2028.  She was recommended to add in FDguard twice daily for her upper GI symptoms with consideration of restarting metoclopramide in the future to relieve belching.  She has been lost to follow-up ever since 2018.  Of note, patient is on a statin medication.  Labs 7/12/2022: Negative BRIANA, positive ASMA of 55, negative hepatitis A antibody total, negative hepatitis B surface antigen, negative hepatitis B surface antibody, negative hepatitis B core antibody, negative hepatitis C antibody, low ceruloplasmin 17.6, positive AMA 44.9, elevated , elevated , normal ALP, normal total bilirubin, normal iron, normal ferritin, normal vitamin D, unremarkable lipid panel.  Labs 6/7/2022:, , hemoglobin 11.7, normal hematocrit, normal platelets.  RUQ US 8/17/22: Mildly echogenic liver suggesting hepatic steatosis. Normal shear wave velocity.   Labs 11/7/22: unremarkable CBC, normal lipid panel, AST 54, ALT 66, normal ALP, normal total bilirubin.   She denies a family history of liver disease. She rarely consumes ETOH and denies excessive Tylenol use. She goes to [facility] for routine eye exams. She was recently seen in February and states everything was normal. She does have "sinus problems" described as water building up behind her eyes. She did have back surgery in January 2020. She fell shortly after that surgery and now has chronic hip pain. She does have shoulder pains which she attributes to her sleeping position secondary to her hip pain. She otherwise denies arthralgias. She does admit to worsening fatigue as of late. She admits she had the Covid booster in April prior to having her blood drawn in May.   Bowel movements are typically regular. SHe may have intermittent constipation. She does strain on occasion. She notices her constipation worsens when she eats beef. She denies blood per rectum or melena. She does have abdominal discomfort during bouts of constipation. This may be located in the RLQ or RUQ. The episodes are intermittent and not bothersome. She denies nausea or vomiting. She does have chronic GERD. She has to sleep with two pillows at night. She states the indigestion and epigastric burning will awaken her at night. She was taking up to 80 mg of OTC Pepcid a day. She was started on Pantoprazole 40 mg yesterday. She has already noticed improvement in sleep. She would like to proceed with a repeat EGD as she is concerned for gastric cancer or a stomach ulcer. Interval history, 11/30/2022.  Upper endoscopy performed November 18 revealed a small hiatal hernia along with proton pump inhibitor polyps.  An actively bleeding AVM was seen in the third portion of the duodenum and was cauterized. The patient states overall she feels well at this time.  She discontinued her supplements.  I reviewed her supplements with her.  I provided extensive teaching in regards to autoimmune liver disease and arteriovenous malformations. interval history, 4/6/2023.  The patient states she has been in good health since I last saw her.  She had no laboratory testing since then.  She states her bowel movements are good with MiraLAX which she titrates to effect.  She states her acid reflux is under good control with pantoprazole 40 mg daily.  Interval history, 9/12/2023: 68-year-old female presents for follow-up.  She was last seen on 4/6/2023.  She had abnormal LFTs of unknown etiology with mild elevations in autoimmune markers.  We will continue to trend and if increased we will try prednisone or a lower liver biopsy.  Labs 4/6/2023:Creatinine 1.21, GFR 48, total bilirubin 0.3, alkaline phosphatase 47, AST 36, ALT 35, hemoglobin 10.1, hematocrit 32.6, MCV 78.4, normal platelet count 363.  She was informed liver enzymes are improved but still slightly abnormal.  She was informed of mild anemia and recommended iron studies.  Labs 5/16/2023:Unremarkable urinalysis, normal total bilirubin, ALT 48, AST 45, normal alkaline phosphatase, normal lipid panel, normal direct bilirubin, hemoglobin 10.4, hematocrit 32.6, MCV 76.3.  He never obtained iron studies so she was started on daily oral iron supplement by her PCP. She has been taking the iron since May. This has caused dark stools. Her constipation is well contorlled on Miralax daily. Denies abdominal pain, blood per rectum or melena. Denies nausea or vomiting. GERD is well controlled on Pantoprazole 40 mg once daily. She does take OTC Pepcid most nights as well. Interval history, 12/21/2023.  Laboratory testing from November 20 of this year revealed a normal CBC, normal CMP except for elevated ALT and AST at 62 and 55 respectively. The patient states that she continues to have oral pharyngeal dysphagia that is intermittent.  Often with soup.  She does state that when the most recent liver tests were performed she had recently started a new over-the-counter supplement for plantar fasciitis which she continues to take on a daily basis.

## 2024-02-13 ENCOUNTER — OV EP (OUTPATIENT)
Dept: URBAN - METROPOLITAN AREA CLINIC 113 | Facility: CLINIC | Age: 69
End: 2024-02-13
Payer: MEDICARE

## 2024-02-13 VITALS
DIASTOLIC BLOOD PRESSURE: 67 MMHG | SYSTOLIC BLOOD PRESSURE: 154 MMHG | BODY MASS INDEX: 30.56 KG/M2 | WEIGHT: 179 LBS | HEART RATE: 98 BPM | TEMPERATURE: 97.5 F | HEIGHT: 64 IN | RESPIRATION RATE: 20 BRPM

## 2024-02-13 DIAGNOSIS — R74.8 ELEVATED LIVER ENZYMES: ICD-10-CM

## 2024-02-13 DIAGNOSIS — R13.19 ESOPHAGEAL DYSPHAGIA: ICD-10-CM

## 2024-02-13 DIAGNOSIS — K52.9 ACUTE AND CHRONIC COLITIS: ICD-10-CM

## 2024-02-13 DIAGNOSIS — K21.9 ACID REFLUX: ICD-10-CM

## 2024-02-13 PROCEDURE — 99214 OFFICE O/P EST MOD 30 MIN: CPT

## 2024-02-13 RX ORDER — PANTOPRAZOLE SODIUM 40 MG/1
1 TABLET TABLET, DELAYED RELEASE ORAL ONCE A DAY
Status: ACTIVE | COMMUNITY

## 2024-02-13 RX ORDER — AMOXICILLIN AND CLAVULANATE POTASSIUM 875; 125 MG/1; MG/1
TABLET, FILM COATED ORAL
Qty: 20 | Refills: 0 | Status: ON HOLD | COMMUNITY
Start: 2013-10-11

## 2024-02-13 RX ORDER — ESTRADIOL 0.5 MG/1
TABLET ORAL
Qty: 30 | Refills: 0 | Status: ON HOLD | COMMUNITY
Start: 2013-09-25

## 2024-02-13 RX ORDER — GUAIFENESIN 600 MG/1
1 TABLET AS NEEDED TABLET, EXTENDED RELEASE ORAL
Status: ACTIVE | COMMUNITY

## 2024-02-13 RX ORDER — FERROUS SULFATE 325(65) MG
1 TABLET TABLET ORAL
Status: ON HOLD | COMMUNITY

## 2024-02-13 RX ORDER — FENOFIBRATE 134 MG/1
1 CAPSULE WITH A MEAL CAPSULE ORAL ONCE A DAY
Status: ACTIVE | COMMUNITY

## 2024-02-13 RX ORDER — HYDROCODONE BITARTRATE AND ACETAMINOPHEN 7.5; 325 MG/1; MG/1
TABLET ORAL
Qty: 14 | Refills: 0 | Status: ON HOLD | COMMUNITY
Start: 2014-07-18

## 2024-02-13 RX ORDER — PREDNISONE 20 MG/1
TABLET ORAL
Qty: 6 | Refills: 0 | Status: ON HOLD | COMMUNITY
Start: 2013-10-11

## 2024-02-13 RX ORDER — MULTIVIT-MIN/FA/LYCOPEN/LUTEIN .4-300-25
TAKE 1 TABLET DAILY TABLET ORAL
Refills: 0 | Status: ON HOLD | COMMUNITY

## 2024-02-13 RX ORDER — ZINC GLUCONATE 50 MG
1 TABLET TABLET ORAL ONCE A DAY
Status: ON HOLD | COMMUNITY

## 2024-02-13 RX ORDER — AMOXICILLIN 875 MG/1
TABLET, FILM COATED ORAL
Qty: 20 | Refills: 0 | Status: ON HOLD | COMMUNITY
Start: 2013-03-19

## 2024-02-13 RX ORDER — OXYCODONE AND ACETAMINOPHEN 5; 325 MG/1; MG/1
TABLET ORAL
Qty: 40 | Refills: 0 | Status: ON HOLD | COMMUNITY
Start: 2013-03-29

## 2024-02-13 RX ORDER — FAMOTIDINE 40 MG/1
TABLET ORAL
Qty: 30 | Refills: 0 | Status: ON HOLD | COMMUNITY
Start: 2013-09-25

## 2024-02-13 RX ORDER — BENAZEPRIL HYDROCHLORIDE AND HYDROCHLOROTHIAZIDE 20; 12.5 MG/1; MG/1
TABLET, FILM COATED ORAL
Qty: 30 | Refills: 0 | Status: ACTIVE | COMMUNITY
Start: 2013-04-01

## 2024-02-13 RX ORDER — ATORVASTATIN CALCIUM 40 MG/1
TAKE 1 TABLET DAILY AT BEDTIME TABLET, FILM COATED ORAL
Qty: 30 | Refills: 0 | Status: ACTIVE | COMMUNITY
Start: 2013-06-14

## 2024-02-13 RX ORDER — PANTOPRAZOLE SODIUM 40 MG/1
1 TABLET TABLET, DELAYED RELEASE ORAL TWICE DAILY
Qty: 180 | Refills: 2 | OUTPATIENT
Start: 2024-02-13

## 2024-02-13 RX ORDER — AZELASTINE HYDROCHLORIDE AND FLUTICASONE PROPIONATE 137; 50 UG/1; UG/1
SPRAY, METERED NASAL
Qty: 23 | Refills: 0 | Status: ON HOLD | COMMUNITY
Start: 2013-09-20

## 2024-02-13 RX ORDER — ASPIRIN 81 MG/1
1 TABLET TABLET, COATED ORAL ONCE A DAY
Status: ACTIVE | COMMUNITY

## 2024-02-13 RX ORDER — MELATONIN 1 MG/ML
1 CAPSULE LIQUID (ML) ORAL ONCE A DAY
Status: ACTIVE | COMMUNITY

## 2024-02-13 NOTE — HPI-TODAY'S VISIT:
67-year-old female with a history of vitamin D deficiency, hyperlipidemia, hypertension, GERD is referred by Dr. Jamal Grimes for evaluation of elevated liver enzymes.  A copy of today's visit will be forwarded to the referring provider.  Patient has been followed by Dr. Gordon since 2008.  Screening colonoscopy performed in May 2008 revealed focal active colitis and ileitis suggestive of IBD.  Possible subclinical Crohn's disease was suspected though she was asymptomatic and no treatment was required at that time.  EGD performed in May 2014 for severe belching and GERD symptoms was notable for a small hiatal hernia and nonspecific chronic inactive gastritis.  Gastric emptying study at that same time demonstrated markedly delayed gastric emptying suggesting gastroparesis or possible gastric outlet obstruction.  She was to continue Pepcid twice daily and metoclopramide twice daily was added in.  This provided significant improvement she was to continue this regimen.  She did have elevated liver enzymes in 2014.  Ultrasound was unremarkable and additional labs were performed however it appears this only included negative iron studies and hepatitis serologies. Repeat colonoscopy in June 2018 was notable for diffuse chronic active colitis of the cecum of unknown etiology.  Again findings were suspicious for asymptomatic ulcerative colitis versus Crohn's though may also be NSAID induced colitis.  She was encouraged to minimize her NSAID use.  Next colonoscopy is due in June 2028.  She was recommended to add in FDguard twice daily for her upper GI symptoms with consideration of restarting metoclopramide in the future to relieve belching.  She has been lost to follow-up ever since 2018.  Of note, patient is on a statin medication.  Labs 7/12/2022: Negative BRIANA, positive ASMA of 55, negative hepatitis A antibody total, negative hepatitis B surface antigen, negative hepatitis B surface antibody, negative hepatitis B core antibody, negative hepatitis C antibody, low ceruloplasmin 17.6, positive AMA 44.9, elevated , elevated , normal ALP, normal total bilirubin, normal iron, normal ferritin, normal vitamin D, unremarkable lipid panel.  Labs 6/7/2022:, , hemoglobin 11.7, normal hematocrit, normal platelets.  RUQ US 8/17/22: Mildly echogenic liver suggesting hepatic steatosis. Normal shear wave velocity.   Labs 11/7/22: unremarkable CBC, normal lipid panel, AST 54, ALT 66, normal ALP, normal total bilirubin.   She denies a family history of liver disease. She rarely consumes ETOH and denies excessive Tylenol use. She goes to [facility] for [facility] exams. She was recently seen in February and states everything was normal. She does have "sinus problems" described as water building up behind her eyes. She did have back surgery in January 2020. She fell shortly after that surgery and now has chronic hip pain. She does have shoulder pains which she attributes to her sleeping position secondary to her hip pain. She otherwise denies arthralgias. She does admit to worsening fatigue as of late. She admits she had the Covid booster in April prior to having her blood drawn in May.   Bowel movements are typically regular. SHe may have intermittent constipation. She does strain on occasion. She notices her constipation worsens when she eats beef. She denies blood per rectum or melena. She does have abdominal discomfort during bouts of constipation. This may be located in the RLQ or RUQ. The episodes are intermittent and not bothersome. She denies nausea or vomiting. She does have chronic GERD. She has to sleep with two pillows at night. She states the indigestion and epigastric burning will awaken her at night. She was taking up to 80 mg of OTC Pepcid a day. She was started on Pantoprazole 40 mg yesterday. She has already noticed improvement in sleep. She would like to proceed with a repeat EGD as she is concerned for gastric cancer or a stomach ulcer. Interval history, 11/30/2022.  Upper endoscopy performed November 18 revealed a small hiatal hernia along with proton pump inhibitor polyps.  An actively bleeding AVM was seen in the third portion of the duodenum and was cauterized. The patient states overall she feels well at this time.  She discontinued her supplements.  I reviewed her supplements with her.  I provided extensive teaching in regards to autoimmune liver disease and arteriovenous malformations. interval history, 4/6/2023.  The patient states she has been in good health since I last saw her.  She had no laboratory testing since then.  She states her bowel movements are good with MiraLAX which she titrates to effect.  She states her acid reflux is under good control with pantoprazole 40 mg daily.  Interval history, 9/12/2023: 68-year-old female presents for follow-up.  She was last seen on 4/6/2023.  She had abnormal LFTs of unknown etiology with mild elevations in autoimmune markers.  We will continue to trend and if increased we will try prednisone or a lower liver biopsy.  Labs 4/6/2023:Creatinine 1.21, GFR 48, total bilirubin 0.3, alkaline phosphatase 47, AST 36, ALT 35, hemoglobin 10.1, hematocrit 32.6, MCV 78.4, normal platelet count 363.  She was informed liver enzymes are improved but still slightly abnormal.  She was informed of mild anemia and recommended iron studies.  Labs 5/16/2023:Unremarkable urinalysis, normal total bilirubin, ALT 48, AST 45, normal alkaline phosphatase, normal lipid panel, normal direct bilirubin, hemoglobin 10.4, hematocrit 32.6, MCV 76.3.  He never obtained iron studies so she was started on daily oral iron supplement by her PCP. She has been taking the iron since May. This has caused dark stools. Her constipation is well contorlled on Miralax daily. Denies abdominal pain, blood per rectum or melena. Denies nausea or vomiting. GERD is well controlled on Pantoprazole 40 mg once daily. She does take OTC Pepcid most nights as well. Interval history, 12/21/2023.  Laboratory testing from November 20 of this year revealed a normal CBC, normal CMP except for elevated ALT and AST at 62 and 55 respectively. The patient states that she continues to have oral pharyngeal dysphagia that is intermittent.  Often with soup.  She does state that when the most recent liver tests were performed she had recently started a new over-the-counter supplement for plantar fasciitis which she continues to take on a daily basis. Interval history, 2/13/2024:69-year-old female presents for follow-up.  She was last seen on 12/21/2023.  She was recommended a barium swallow due to her dysphagia as well as repeat labs and MRCP due to elevated liver enzymes. Barium swallow 12/26/2023:Mild esophageal dysmotility otherwise unremarkable barium swallow. MRI with MRCP 12/29/2023:No evidence of biliary ductal dilation or filling defect. Labs 1/12/2024:Glucose 166, GFR 44, normal total bilirubin, AST 41, ALT 48, alk phos 40.  She had been off of her plantar fasciitis supplement for 3 weeks prior to repeating labs. Her LFTs have improved. She does have arthritis and was recommended to restart Tylenol arthritis. Denies abdominal pain or bloody diarrhea. Her bowels move well on Miralax. She is considering increased this to daily. She does still struggle with reflux and belching.

## 2024-02-14 LAB
ALBUMIN/GLOBULIN RATIO: 1.6
ALBUMIN: 4.2
ALKALINE PHOSPHATASE: 46
ALT (SGPT): 39
AST (SGOT): 35
BILIRUBIN, DIRECT: 0.1
BILIRUBIN, INDIRECT: 0.2
BILIRUBIN, TOTAL: 0.3
GLOBULIN: 2.7
PROTEIN, TOTAL: 6.9

## 2024-11-10 ENCOUNTER — ERX REFILL RESPONSE (OUTPATIENT)
Dept: URBAN - METROPOLITAN AREA CLINIC 107 | Facility: CLINIC | Age: 69
End: 2024-11-10

## 2024-11-10 RX ORDER — PANTOPRAZOLE SODIUM 40 MG/1
TAKE 1 TABLET BY MOUTH EVERY DAY TABLET, DELAYED RELEASE ORAL
Qty: 90 TABLET | Refills: 0 | OUTPATIENT

## 2024-11-10 RX ORDER — PANTOPRAZOLE SODIUM 40 MG/1
1 TABLET TABLET, DELAYED RELEASE ORAL ONCE A DAY
OUTPATIENT

## 2024-12-09 ENCOUNTER — ERX REFILL RESPONSE (OUTPATIENT)
Dept: URBAN - METROPOLITAN AREA CLINIC 113 | Facility: CLINIC | Age: 69
End: 2024-12-09

## 2024-12-09 RX ORDER — PANTOPRAZOLE SODIUM 40 MG/1
TAKE 1 TABLET BY MOUTH TWICE A DAY FOR 90 DAYS TABLET, DELAYED RELEASE ORAL
Qty: 180 TABLET | Refills: 2 | OUTPATIENT

## 2024-12-09 RX ORDER — PANTOPRAZOLE SODIUM 40 MG/1
TAKE 1 TABLET BY MOUTH EVERY DAY TABLET, DELAYED RELEASE ORAL
Qty: 90 TABLET | Refills: 0 | OUTPATIENT